# Patient Record
Sex: FEMALE | Race: WHITE | NOT HISPANIC OR LATINO | Employment: STUDENT | URBAN - METROPOLITAN AREA
[De-identification: names, ages, dates, MRNs, and addresses within clinical notes are randomized per-mention and may not be internally consistent; named-entity substitution may affect disease eponyms.]

---
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
## 2023-04-05 ENCOUNTER — HOSPITAL ENCOUNTER (OUTPATIENT)
Dept: RESEARCH | Facility: CLINIC | Age: 18
Discharge: HOME | End: 2023-04-05
Attending: INTERNAL MEDICINE | Admitting: INTERNAL MEDICINE
Payer: COMMERCIAL

## 2023-04-05 PROCEDURE — 510N000017 HC CRU PATIENT CARE, PER 15 MIN

## 2023-04-05 PROCEDURE — 510N000009 HC RESEARCH FACILITY, PER 15 MIN

## 2023-04-05 PROCEDURE — 300N000003 HC RESEARCH SPECIMEN PROCESSING, SIMPLE

## 2023-04-05 NOTE — ADDENDUM NOTE
Encounter addended by: Ольга Arnett on: 4/5/2023 2:46 PM   Actions taken: Charge Capture section accepted

## 2023-04-17 NOTE — ADDENDUM NOTE
Encounter addended by: Chantal Hall RN on: 4/17/2023 1:24 PM   Actions taken: Charge Capture section accepted

## 2023-11-06 ENCOUNTER — HOSPITAL ENCOUNTER (OUTPATIENT)
Dept: RESEARCH | Facility: CLINIC | Age: 18
Discharge: HOME | End: 2023-11-06
Attending: INTERNAL MEDICINE | Admitting: INTERNAL MEDICINE
Payer: COMMERCIAL

## 2023-11-06 PROCEDURE — 510N000009 HC RESEARCH FACILITY, PER 15 MIN

## 2023-11-06 PROCEDURE — 300N000003 HC RESEARCH SPECIMEN PROCESSING, SIMPLE

## 2023-11-06 PROCEDURE — 510N000017 HC CRU PATIENT CARE, PER 15 MIN

## 2023-11-07 NOTE — ADDENDUM NOTE
Encounter addended by: Clare Gunn LPN on: 11/6/2023 6:54 PM   Actions taken: Charge Capture section accepted